# Patient Record
Sex: FEMALE | Race: OTHER | HISPANIC OR LATINO
[De-identification: names, ages, dates, MRNs, and addresses within clinical notes are randomized per-mention and may not be internally consistent; named-entity substitution may affect disease eponyms.]

---

## 2019-09-25 ENCOUNTER — NON-APPOINTMENT (OUTPATIENT)
Age: 34
End: 2019-09-25

## 2019-09-25 ENCOUNTER — APPOINTMENT (OUTPATIENT)
Dept: OPHTHALMOLOGY | Facility: CLINIC | Age: 34
End: 2019-09-25
Payer: COMMERCIAL

## 2019-09-25 PROCEDURE — 92002 INTRM OPH EXAM NEW PATIENT: CPT

## 2019-10-01 ENCOUNTER — NON-APPOINTMENT (OUTPATIENT)
Age: 34
End: 2019-10-01

## 2019-10-01 ENCOUNTER — APPOINTMENT (OUTPATIENT)
Dept: OPHTHALMOLOGY | Facility: CLINIC | Age: 34
End: 2019-10-01
Payer: COMMERCIAL

## 2019-10-01 PROBLEM — Z00.00 ENCOUNTER FOR PREVENTIVE HEALTH EXAMINATION: Status: ACTIVE | Noted: 2019-10-01

## 2019-10-01 PROCEDURE — 92002 INTRM OPH EXAM NEW PATIENT: CPT

## 2019-10-15 ENCOUNTER — APPOINTMENT (OUTPATIENT)
Dept: OPHTHALMOLOGY | Facility: CLINIC | Age: 34
End: 2019-10-15

## 2019-11-12 ENCOUNTER — NON-APPOINTMENT (OUTPATIENT)
Age: 34
End: 2019-11-12

## 2019-11-12 ENCOUNTER — APPOINTMENT (OUTPATIENT)
Dept: OPHTHALMOLOGY | Facility: CLINIC | Age: 34
End: 2019-11-12
Payer: COMMERCIAL

## 2019-11-12 PROCEDURE — 92014 COMPRE OPH EXAM EST PT 1/>: CPT

## 2019-11-19 ENCOUNTER — NON-APPOINTMENT (OUTPATIENT)
Age: 34
End: 2019-11-19

## 2019-11-19 ENCOUNTER — APPOINTMENT (OUTPATIENT)
Dept: OPHTHALMOLOGY | Facility: CLINIC | Age: 34
End: 2019-11-19
Payer: COMMERCIAL

## 2019-11-19 PROCEDURE — 92012 INTRM OPH EXAM EST PATIENT: CPT

## 2021-05-11 ENCOUNTER — NON-APPOINTMENT (OUTPATIENT)
Age: 36
End: 2021-05-11

## 2021-05-11 ENCOUNTER — APPOINTMENT (OUTPATIENT)
Dept: OPHTHALMOLOGY | Facility: CLINIC | Age: 36
End: 2021-05-11
Payer: COMMERCIAL

## 2021-05-11 PROCEDURE — 99072 ADDL SUPL MATRL&STAF TM PHE: CPT

## 2021-05-11 PROCEDURE — 92012 INTRM OPH EXAM EST PATIENT: CPT

## 2023-07-25 ENCOUNTER — APPOINTMENT (OUTPATIENT)
Dept: UROLOGY | Facility: CLINIC | Age: 38
End: 2023-07-25
Payer: COMMERCIAL

## 2023-07-25 VITALS
TEMPERATURE: 97.2 F | BODY MASS INDEX: 29.44 KG/M2 | HEART RATE: 91 BPM | HEIGHT: 62 IN | OXYGEN SATURATION: 99 % | DIASTOLIC BLOOD PRESSURE: 86 MMHG | WEIGHT: 160 LBS | SYSTOLIC BLOOD PRESSURE: 128 MMHG

## 2023-07-25 DIAGNOSIS — F41.9 ANXIETY DISORDER, UNSPECIFIED: ICD-10-CM

## 2023-07-25 DIAGNOSIS — Z78.9 OTHER SPECIFIED HEALTH STATUS: ICD-10-CM

## 2023-07-25 DIAGNOSIS — F32.A ANXIETY DISORDER, UNSPECIFIED: ICD-10-CM

## 2023-07-25 DIAGNOSIS — R39.15 URGENCY OF URINATION: ICD-10-CM

## 2023-07-25 PROCEDURE — 99204 OFFICE O/P NEW MOD 45 MIN: CPT

## 2023-07-25 RX ORDER — BUPROPION HYDROCHLORIDE 100 MG/1
TABLET, FILM COATED ORAL
Refills: 0 | Status: ACTIVE | COMMUNITY

## 2023-07-25 RX ORDER — ESCITALOPRAM OXALATE 5 MG/1
TABLET, FILM COATED ORAL
Refills: 0 | Status: ACTIVE | COMMUNITY

## 2023-07-26 NOTE — LETTER BODY
[Dear  ___] : Dear  [unfilled], [Consult Letter:] : I had the pleasure of evaluating your patient, [unfilled]. [Please see my note below.] : Please see my note below. [Consult Closing:] : Thank you very much for allowing me to participate in the care of this patient.  If you have any questions, please do not hesitate to contact me. [Sincerely,] : Sincerely, [FreeTextEntry3] : Cora Zavala MD\par System Director Urogynecology/FPMRS\par Department of Urology\par [facility] \par   at [facility]\par  of Urology\par [employer] [employer] at [facility]\par

## 2023-07-26 NOTE — PHYSICAL EXAM
[General Appearance - Well Developed] : well developed [General Appearance - Well Nourished] : well nourished [Normal Appearance] : normal appearance [Well Groomed] : well groomed [General Appearance - In No Acute Distress] : no acute distress [Edema] : no peripheral edema Statement Selected [Respiration, Rhythm And Depth] : normal respiratory rhythm and effort [Exaggerated Use Of Accessory Muscles For Inspiration] : no accessory muscle use [Abdomen Soft] : soft [Abdomen Tenderness] : non-tender [Costovertebral Angle Tenderness] : no ~M costovertebral angle tenderness [Normal Station and Gait] : the gait and station were normal for the patient's age [Oriented To Time, Place, And Person] : oriented to person, place, and time [Affect] : the affect was normal [Mood] : the mood was normal [Not Anxious] : not anxious [Urethral Meatus] : the meatus of the urethra showed no abnormalities [External Female Genitalia] : normal external genitalia [] : no rash [FreeTextEntry1] : neg UH, neg CST, no prolapse noted, no hypertonus muscles noted.

## 2023-07-26 NOTE — ASSESSMENT
[FreeTextEntry1] : \par \par \par Impression/plan: 37 year old woman with urge predominant MENDEZ. \par \par - Options for management of the patient's overactive bladder and incontinence discussed at length. These included medical therapy, behavioral modification, bladder retraining, and surgical options. Surgical options for third line therapies reviewed included injection of botulinum toxin versus sacral nerve stimulation therapy. The patient would like to start with behavioral modification, bladder retraining and medical therapy. Discussed behavioral/diet modification avoiding/minimizing bladder irritants (caffeine, alcohol, carbonated beverages,citrus, acidic, spicy foods) can help with urinary symptoms. Trial Gemtesa call in 1 month for update \par - UA with micro, urine c/s. \par

## 2023-07-26 NOTE — HISTORY OF PRESENT ILLNESS
[FreeTextEntry1] : 37 year old  female here for longstanding urinary urgency. She reports in 2023 was on a 8 hour flight and had really strong urge to void. She felt the urge was different from her baseline which compelled her to be evaluated. She reports last week intermittent burning with urination which she attributes to long plane ride. She reports incontinence and wears 3-4 pads daily. as well as leakage with cough, sneeze and laughing. \par \par Denies fevers,chills, nausea, diarrhea, constipation, hematuria, dysuria\par \par 2023 PVR= 55 ml\par \par Force of stream: strong \par Hesitancy: no\par intermittency: no\par Dribbling: yes at the end \par Daytime frequency: 6-7x\par Nighttime frequency: 2x\par Dysuria: yes this week \par Urgency: yes\par UUI:  yes\par ANNIKA: yes\par Pad usage: 3-4\par Straining to void: no\par Incomplete bladder emptying: no\par UTI: yes in the past \par Hematuria: no\par Stone disease: no\par STD: no\par Bowel Issue: no\par Fluid intake and diet: 2L H2O/ mostly seltzer daily, 1 -2 c coffee, lime/vinegar for salads, sometimes spicy\par Pregnancies: \par Prolapse sensation: no\par Most bothersome symptoms: intermittent burning but mostly urgency\par \par PMH: Anxiety/depression\par Surghx: denies\par Famhx: denies  malignancies\par Social hx: nonsmoker, social ETOH\par Medication: Isabella, Bupropion, Concerta, Lexapro\par Allergies: denies\par \par

## 2023-07-28 ENCOUNTER — NON-APPOINTMENT (OUTPATIENT)
Age: 38
End: 2023-07-28

## 2023-07-28 LAB
APPEARANCE: CLEAR
BACTERIA UR CULT: NORMAL
BACTERIA: NEGATIVE /HPF
BILIRUBIN URINE: NEGATIVE
BLOOD URINE: ABNORMAL
CAST: 0 /LPF
COLOR: YELLOW
EPITHELIAL CELLS: 5 /HPF
GLUCOSE QUALITATIVE U: NEGATIVE MG/DL
KETONES URINE: NEGATIVE MG/DL
LEUKOCYTE ESTERASE URINE: NEGATIVE
MICROSCOPIC-UA: NORMAL
NITRITE URINE: NEGATIVE
PH URINE: 5.5
PROTEIN URINE: NEGATIVE MG/DL
RED BLOOD CELLS URINE: 8 /HPF
SPECIFIC GRAVITY URINE: 1.02
UROBILINOGEN URINE: 0.2 MG/DL
WHITE BLOOD CELLS URINE: 2 /HPF

## 2023-08-10 ENCOUNTER — TRANSCRIPTION ENCOUNTER (OUTPATIENT)
Age: 38
End: 2023-08-10

## 2023-08-11 ENCOUNTER — APPOINTMENT (OUTPATIENT)
Dept: UROLOGY | Facility: CLINIC | Age: 38
End: 2023-08-11
Payer: COMMERCIAL

## 2023-08-11 VITALS
OXYGEN SATURATION: 98 % | DIASTOLIC BLOOD PRESSURE: 85 MMHG | TEMPERATURE: 98.2 F | HEART RATE: 92 BPM | SYSTOLIC BLOOD PRESSURE: 144 MMHG

## 2023-08-11 DIAGNOSIS — R31.29 OTHER MICROSCOPIC HEMATURIA: ICD-10-CM

## 2023-08-11 LAB
BILIRUB UR QL STRIP: NORMAL
CLARITY UR: CLEAR
COLLECTION METHOD: NORMAL
GLUCOSE UR-MCNC: NORMAL
HCG UR QL: 0.2 EU/DL
HGB UR QL STRIP.AUTO: NORMAL
KETONES UR-MCNC: NORMAL
LEUKOCYTE ESTERASE UR QL STRIP: NORMAL
NITRITE UR QL STRIP: NORMAL
PH UR STRIP: 5.5
PROT UR STRIP-MCNC: NORMAL
SP GR UR STRIP: >=1.03

## 2023-08-11 PROCEDURE — 99213 OFFICE O/P EST LOW 20 MIN: CPT | Mod: 25

## 2023-08-11 PROCEDURE — 81003 URINALYSIS AUTO W/O SCOPE: CPT | Mod: QW

## 2023-08-11 PROCEDURE — 52000 CYSTOURETHROSCOPY: CPT

## 2023-08-13 NOTE — HISTORY OF PRESENT ILLNESS
[FreeTextEntry1] : 37 year old  female here for longstanding urinary urgency. She reports in 2023 was on a 8 hour flight and had really strong urge to void. She felt the urge was different from her baseline which compelled her to be evaluated. She reports last week intermittent burning with urination which she attributes to long plane ride. She reports incontinence and wears 3-4 pads daily. as well as leakage with cough, sneeze and laughing.  Denies fevers,chills, nausea, diarrhea, constipation, hematuria, dysuria  2023 PVR= 55 ml  Force of stream: strong  Hesitancy: no  intermittency: no  Dribbling: yes at the end  Daytime frequency: 6-7x  Nighttime frequency: 2x  Dysuria: yes this week  Urgency: yes  UUI: yes  ANNIKA: yes  Pad usage: 3-4  Straining to void: no  Incomplete bladder emptying: no  UTI: yes in the past  Hematuria: no  Stone disease: no  STD: no  Bowel Issue: no  Fluid intake and diet: 2L H2O/ mostly seltzer daily, 1 -2 c coffee, lime/vinegar for salads, sometimes spicy  Pregnancies:   Prolapse sensation: no  Most bothersome symptoms: intermittent burning but mostly urgency  PMH: Anxiety/depression  Surghx: denies  Famhx: denies  malignancies  Social hx: nonsmoker, social ETOH  Medication: Isabella, Bupropion, Concerta, Lexapro  Allergies: denies   23  28 yo woman with microhematuria and OAB here today for cystoscopy and discussion of treatment options.   Cystoscopy unremarkable.   Renal US reviewed - small cyst, otherwise unremarkable.

## 2023-08-13 NOTE — ASSESSMENT
[FreeTextEntry1] :   Impression/plan: 26 yo woman with microhematuria and OAB.   1. Options for management of the patient's overactive bladder and incontinence discussed at length. These included medical therapy, behavioral modification, bladder retraining, and surgical options. Surgical options for third line therapies reviewed included injection of botulinum toxin versus sacral nerve stimulation therapy. The patient would like to start with behavioral modification, bladder retraining. Patient would like to hold off on med tx.

## 2023-08-13 NOTE — LETTER BODY
[Dear  ___] : Dear  [unfilled], [Courtesy Letter:] : I had the pleasure of seeing your patient, [unfilled], in my office today. [Please see my note below.] : Please see my note below. [Consult Closing:] : Thank you very much for allowing me to participate in the care of this patient.  If you have any questions, please do not hesitate to contact me. [Sincerely,] : Sincerely, [FreeTextEntry3] : Cora Zavala MD System Director Urogynecology/FPS Department of Urology [facility]    at [facility]  of Urology [facility] [employer] at [facility]

## 2023-11-01 ENCOUNTER — APPOINTMENT (OUTPATIENT)
Dept: UROLOGY | Facility: CLINIC | Age: 38
End: 2023-11-01
Payer: COMMERCIAL

## 2023-11-01 VITALS
TEMPERATURE: 97.7 F | OXYGEN SATURATION: 97 % | HEART RATE: 93 BPM | DIASTOLIC BLOOD PRESSURE: 87 MMHG | SYSTOLIC BLOOD PRESSURE: 126 MMHG

## 2023-11-01 DIAGNOSIS — N39.46 MIXED INCONTINENCE: ICD-10-CM

## 2023-11-01 DIAGNOSIS — N32.81 OVERACTIVE BLADDER: ICD-10-CM

## 2023-11-01 PROCEDURE — 99213 OFFICE O/P EST LOW 20 MIN: CPT

## 2024-03-05 ENCOUNTER — APPOINTMENT (OUTPATIENT)
Dept: UROLOGY | Facility: CLINIC | Age: 39
End: 2024-03-05

## 2024-05-27 VITALS
TEMPERATURE: 98 F | OXYGEN SATURATION: 99 % | HEART RATE: 77 BPM | WEIGHT: 138.89 LBS | SYSTOLIC BLOOD PRESSURE: 130 MMHG | DIASTOLIC BLOOD PRESSURE: 90 MMHG | RESPIRATION RATE: 16 BRPM | HEIGHT: 63 IN

## 2024-05-27 LAB
ALBUMIN SERPL ELPH-MCNC: 4.2 G/DL — SIGNIFICANT CHANGE UP (ref 3.3–5)
ALP SERPL-CCNC: 88 U/L — SIGNIFICANT CHANGE UP (ref 40–120)
ALT FLD-CCNC: 172 U/L — HIGH (ref 10–45)
ANION GAP SERPL CALC-SCNC: 12 MMOL/L — SIGNIFICANT CHANGE UP (ref 5–17)
AST SERPL-CCNC: 197 U/L — HIGH (ref 10–40)
BASOPHILS # BLD AUTO: 0.04 K/UL — SIGNIFICANT CHANGE UP (ref 0–0.2)
BASOPHILS NFR BLD AUTO: 0.5 % — SIGNIFICANT CHANGE UP (ref 0–2)
BILIRUB SERPL-MCNC: 0.6 MG/DL — SIGNIFICANT CHANGE UP (ref 0.2–1.2)
BUN SERPL-MCNC: 9 MG/DL — SIGNIFICANT CHANGE UP (ref 7–23)
CALCIUM SERPL-MCNC: 10.1 MG/DL — SIGNIFICANT CHANGE UP (ref 8.4–10.5)
CHLORIDE SERPL-SCNC: 102 MMOL/L — SIGNIFICANT CHANGE UP (ref 96–108)
CO2 SERPL-SCNC: 27 MMOL/L — SIGNIFICANT CHANGE UP (ref 22–31)
CREAT SERPL-MCNC: 0.75 MG/DL — SIGNIFICANT CHANGE UP (ref 0.5–1.3)
EGFR: 104 ML/MIN/1.73M2 — SIGNIFICANT CHANGE UP
EOSINOPHIL # BLD AUTO: 0.19 K/UL — SIGNIFICANT CHANGE UP (ref 0–0.5)
EOSINOPHIL NFR BLD AUTO: 2.6 % — SIGNIFICANT CHANGE UP (ref 0–6)
GLUCOSE SERPL-MCNC: 109 MG/DL — HIGH (ref 70–99)
HCG SERPL-ACNC: <1 MIU/ML — SIGNIFICANT CHANGE UP
HCT VFR BLD CALC: 35 % — SIGNIFICANT CHANGE UP (ref 34.5–45)
HGB BLD-MCNC: 11.7 G/DL — SIGNIFICANT CHANGE UP (ref 11.5–15.5)
IMM GRANULOCYTES NFR BLD AUTO: 0.4 % — SIGNIFICANT CHANGE UP (ref 0–0.9)
LIDOCAIN IGE QN: 105 U/L — HIGH (ref 7–60)
LYMPHOCYTES # BLD AUTO: 1.59 K/UL — SIGNIFICANT CHANGE UP (ref 1–3.3)
LYMPHOCYTES # BLD AUTO: 21.4 % — SIGNIFICANT CHANGE UP (ref 13–44)
MAGNESIUM SERPL-MCNC: 2 MG/DL — SIGNIFICANT CHANGE UP (ref 1.6–2.6)
MCHC RBC-ENTMCNC: 28.8 PG — SIGNIFICANT CHANGE UP (ref 27–34)
MCHC RBC-ENTMCNC: 33.4 GM/DL — SIGNIFICANT CHANGE UP (ref 32–36)
MCV RBC AUTO: 86.2 FL — SIGNIFICANT CHANGE UP (ref 80–100)
MONOCYTES # BLD AUTO: 0.47 K/UL — SIGNIFICANT CHANGE UP (ref 0–0.9)
MONOCYTES NFR BLD AUTO: 6.3 % — SIGNIFICANT CHANGE UP (ref 2–14)
NEUTROPHILS # BLD AUTO: 5.1 K/UL — SIGNIFICANT CHANGE UP (ref 1.8–7.4)
NEUTROPHILS NFR BLD AUTO: 68.8 % — SIGNIFICANT CHANGE UP (ref 43–77)
NRBC # BLD: 0 /100 WBCS — SIGNIFICANT CHANGE UP (ref 0–0)
PLATELET # BLD AUTO: 422 K/UL — HIGH (ref 150–400)
POTASSIUM SERPL-MCNC: 3.8 MMOL/L — SIGNIFICANT CHANGE UP (ref 3.5–5.3)
POTASSIUM SERPL-SCNC: 3.8 MMOL/L — SIGNIFICANT CHANGE UP (ref 3.5–5.3)
PROT SERPL-MCNC: 7.5 G/DL — SIGNIFICANT CHANGE UP (ref 6–8.3)
RBC # BLD: 4.06 M/UL — SIGNIFICANT CHANGE UP (ref 3.8–5.2)
RBC # FLD: 12.2 % — SIGNIFICANT CHANGE UP (ref 10.3–14.5)
SODIUM SERPL-SCNC: 141 MMOL/L — SIGNIFICANT CHANGE UP (ref 135–145)
TROPONIN T, HIGH SENSITIVITY RESULT: <6 NG/L — SIGNIFICANT CHANGE UP (ref 0–51)
WBC # BLD: 7.42 K/UL — SIGNIFICANT CHANGE UP (ref 3.8–10.5)
WBC # FLD AUTO: 7.42 K/UL — SIGNIFICANT CHANGE UP (ref 3.8–10.5)

## 2024-05-27 PROCEDURE — 93010 ELECTROCARDIOGRAM REPORT: CPT

## 2024-05-27 PROCEDURE — 99285 EMERGENCY DEPT VISIT HI MDM: CPT

## 2024-05-27 RX ORDER — SODIUM CHLORIDE 9 MG/ML
1000 INJECTION INTRAMUSCULAR; INTRAVENOUS; SUBCUTANEOUS ONCE
Refills: 0 | Status: COMPLETED | OUTPATIENT
Start: 2024-05-27 | End: 2024-05-27

## 2024-05-27 RX ORDER — PANTOPRAZOLE SODIUM 20 MG/1
20 TABLET, DELAYED RELEASE ORAL ONCE
Refills: 0 | Status: COMPLETED | OUTPATIENT
Start: 2024-05-27 | End: 2024-05-27

## 2024-05-27 RX ORDER — FAMOTIDINE 10 MG/ML
20 INJECTION INTRAVENOUS ONCE
Refills: 0 | Status: COMPLETED | OUTPATIENT
Start: 2024-05-27 | End: 2024-05-27

## 2024-05-27 RX ORDER — ONDANSETRON 8 MG/1
4 TABLET, FILM COATED ORAL ONCE
Refills: 0 | Status: COMPLETED | OUTPATIENT
Start: 2024-05-27 | End: 2024-05-27

## 2024-05-27 RX ORDER — KETOROLAC TROMETHAMINE 30 MG/ML
15 SYRINGE (ML) INJECTION ONCE
Refills: 0 | Status: DISCONTINUED | OUTPATIENT
Start: 2024-05-27 | End: 2024-05-27

## 2024-05-27 RX ADMIN — PANTOPRAZOLE SODIUM 20 MILLIGRAM(S): 20 TABLET, DELAYED RELEASE ORAL at 23:28

## 2024-05-27 RX ADMIN — SODIUM CHLORIDE 2000 MILLILITER(S): 9 INJECTION INTRAMUSCULAR; INTRAVENOUS; SUBCUTANEOUS at 22:35

## 2024-05-27 RX ADMIN — Medication 30 MILLILITER(S): at 23:28

## 2024-05-27 RX ADMIN — ONDANSETRON 4 MILLIGRAM(S): 8 TABLET, FILM COATED ORAL at 22:36

## 2024-05-27 RX ADMIN — FAMOTIDINE 20 MILLIGRAM(S): 10 INJECTION INTRAVENOUS at 23:29

## 2024-05-27 RX ADMIN — Medication 15 MILLIGRAM(S): at 23:28

## 2024-05-27 NOTE — ED PROVIDER NOTE - CLINICAL SUMMARY MEDICAL DECISION MAKING FREE TEXT BOX
38-year-old female with upper abdominal pain will evaluate for GERD pancreatitis symptomatic cholelithiasis versus cholecystitis plan for CBC CMP lipase hCG EKG   less likely to have atypical ACS but given her overall benign exam will add on troponin     EKG   Pepcid Maalox Protonix Toradol   reassessment 38-year-old female with upper abdominal pain will evaluate for GERD pancreatitis symptomatic cholelithiasis versus cholecystitis plan for CBC CMP lipase hCG EKG  Normal sinus rhythm 68 normal axis and intervals no acute ischemia no STEMI   less likely to have atypical ACS but given her overall benign exam will add on troponin     EKG   Pepcid Maalox Protonix Toradol   reassessment

## 2024-05-27 NOTE — ED PROVIDER NOTE - PROGRESS NOTE DETAILS
Patient with elevated lipase and LFTs mild pancreatitis right upper quadrant pending will consider advanced imaging if right upper quadrant negative will reassess patient and ask if patient has been drinking alcohol. Patient with mild pancreatitis feeling improved after pain medication still mildly tender in the epigastrium ultrasound shows mild CBD dilation no gallstones, will require admission for further workup MRCP patient did have a drink yesterday of lemon cello does not drink every day has not had issues previously.  Does not take Ozempic

## 2024-05-27 NOTE — ED ADULT TRIAGE NOTE - CHIEF COMPLAINT QUOTE
Pt, with no reported PMH, presents to ER c/o sudden onset of constant epigastric "9/10 punching" pain with associated N/V for ~5hrs. Pt reports similar episode 2 days ago relieved s/p vomiting, Pt denies any other associated symptoms at this time.

## 2024-05-27 NOTE — ED PROVIDER NOTE - PHYSICAL EXAMINATION
VITAL SIGNS: I have reviewed nursing notes and confirm.  CONSTITUTIONAL: Well appearing, in no acute distress.   SKIN:  warm and dry, no acute rash.   HEAD:  normocephalic, atraumatic.  EYES: EOM intact; conjunctiva and sclera clear.  ENT: No nasal discharge; airway clear.   NECK: Supple.  CARD: S1, S2, Regular rate and rhythm.   RESP:  Clear to auscultation b/l, no wheezes, rales or rhonchi.  ABD:Mild epigastric and right upper quadrant tenderness with negative Fall sign no rebound or guarding no CVA tenderness  EXT: Normal ROM. No peripheral edema. Pulses intact and equal b/l.  NEURO: Alert, oriented, grossly unremarkable  PSYCH: Cooperative, mood and affect appropriate.

## 2024-05-27 NOTE — ED PROVIDER NOTE - OBJECTIVE STATEMENT
38-year-old female  past medical history of GERD, here today with epigastric pain associated with nausea and vomiting which has been ongoing since this past Saturday.  Patient states that she had an episode a Saturday which has not subsided and today came back at around 6 PM patient developed severe epigastric pain nonradiating associated with nonbloody nonbilious emesis.  Denies fever chills cough.  No shortness of breath.  No radiation to right upper quadrant.  Denies drinking alcohol. denies associated urinary symptoms

## 2024-05-27 NOTE — ED ADULT NURSE NOTE - NSFALLUNIVINTERV_ED_ALL_ED
Bed/Stretcher in lowest position, wheels locked, appropriate side rails in place/Call bell, personal items and telephone in reach/Instruct patient to call for assistance before getting out of bed/chair/stretcher/Non-slip footwear applied when patient is off stretcher/[place] to call system/Physically safe environment - no spills, clutter or unnecessary equipment/Purposeful proactive rounding/Room/bathroom lighting operational, light cord in reach

## 2024-05-27 NOTE — ED ADULT NURSE NOTE - OBJECTIVE STATEMENT
pt walked in aox4 c/o epigastric abdominal pain since 4 pm. pt had 2 episodes of emesis today. pt states similar episode a few days ago where symptoms resolved after a few hours. peripheral iv placed, labs drawn and sent. pt medicated as per MAR.

## 2024-05-28 ENCOUNTER — INPATIENT (INPATIENT)
Facility: HOSPITAL | Age: 39
LOS: 0 days | Discharge: HOME | DRG: 384 | End: 2024-05-29
Attending: INTERNAL MEDICINE | Admitting: STUDENT IN AN ORGANIZED HEALTH CARE EDUCATION/TRAINING PROGRAM
Payer: COMMERCIAL

## 2024-05-28 DIAGNOSIS — K21.9 GASTRO-ESOPHAGEAL REFLUX DISEASE WITHOUT ESOPHAGITIS: ICD-10-CM

## 2024-05-28 DIAGNOSIS — Z29.9 ENCOUNTER FOR PROPHYLACTIC MEASURES, UNSPECIFIED: ICD-10-CM

## 2024-05-28 DIAGNOSIS — F41.9 ANXIETY DISORDER, UNSPECIFIED: ICD-10-CM

## 2024-05-28 DIAGNOSIS — R10.9 UNSPECIFIED ABDOMINAL PAIN: ICD-10-CM

## 2024-05-28 DIAGNOSIS — F90.9 ATTENTION-DEFICIT HYPERACTIVITY DISORDER, UNSPECIFIED TYPE: ICD-10-CM

## 2024-05-28 DIAGNOSIS — K80.70 CALCULUS OF GALLBLADDER AND BILE DUCT WITHOUT CHOLECYSTITIS WITHOUT OBSTRUCTION: ICD-10-CM

## 2024-05-28 LAB
ADD ON TEST-SPECIMEN IN LAB: SIGNIFICANT CHANGE UP
ALBUMIN SERPL ELPH-MCNC: 3.6 G/DL — SIGNIFICANT CHANGE UP (ref 3.3–5)
ALBUMIN SERPL ELPH-MCNC: 3.7 G/DL — SIGNIFICANT CHANGE UP (ref 3.3–5)
ALP SERPL-CCNC: 107 U/L — SIGNIFICANT CHANGE UP (ref 40–120)
ALP SERPL-CCNC: 94 U/L — SIGNIFICANT CHANGE UP (ref 40–120)
ALT FLD-CCNC: 441 U/L — HIGH (ref 10–45)
ALT FLD-CCNC: 553 U/L — HIGH (ref 10–45)
ANION GAP SERPL CALC-SCNC: 7 MMOL/L — SIGNIFICANT CHANGE UP (ref 5–17)
ANION GAP SERPL CALC-SCNC: 9 MMOL/L — SIGNIFICANT CHANGE UP (ref 5–17)
AST SERPL-CCNC: 529 U/L — HIGH (ref 10–40)
AST SERPL-CCNC: 604 U/L — HIGH (ref 10–40)
BASOPHILS # BLD AUTO: 0.03 K/UL — SIGNIFICANT CHANGE UP (ref 0–0.2)
BASOPHILS NFR BLD AUTO: 0.4 % — SIGNIFICANT CHANGE UP (ref 0–2)
BILIRUB SERPL-MCNC: 0.6 MG/DL — SIGNIFICANT CHANGE UP (ref 0.2–1.2)
BILIRUB SERPL-MCNC: 0.8 MG/DL — SIGNIFICANT CHANGE UP (ref 0.2–1.2)
BUN SERPL-MCNC: 6 MG/DL — LOW (ref 7–23)
BUN SERPL-MCNC: 7 MG/DL — SIGNIFICANT CHANGE UP (ref 7–23)
CALCIUM SERPL-MCNC: 8.9 MG/DL — SIGNIFICANT CHANGE UP (ref 8.4–10.5)
CALCIUM SERPL-MCNC: 9.2 MG/DL — SIGNIFICANT CHANGE UP (ref 8.4–10.5)
CHLORIDE SERPL-SCNC: 106 MMOL/L — SIGNIFICANT CHANGE UP (ref 96–108)
CHLORIDE SERPL-SCNC: 107 MMOL/L — SIGNIFICANT CHANGE UP (ref 96–108)
CHOLEST SERPL-MCNC: 191 MG/DL — SIGNIFICANT CHANGE UP
CO2 SERPL-SCNC: 25 MMOL/L — SIGNIFICANT CHANGE UP (ref 22–31)
CO2 SERPL-SCNC: 27 MMOL/L — SIGNIFICANT CHANGE UP (ref 22–31)
CREAT SERPL-MCNC: 0.73 MG/DL — SIGNIFICANT CHANGE UP (ref 0.5–1.3)
CREAT SERPL-MCNC: 0.75 MG/DL — SIGNIFICANT CHANGE UP (ref 0.5–1.3)
EGFR: 104 ML/MIN/1.73M2 — SIGNIFICANT CHANGE UP
EGFR: 107 ML/MIN/1.73M2 — SIGNIFICANT CHANGE UP
EOSINOPHIL # BLD AUTO: 0.15 K/UL — SIGNIFICANT CHANGE UP (ref 0–0.5)
EOSINOPHIL NFR BLD AUTO: 2.2 % — SIGNIFICANT CHANGE UP (ref 0–6)
GGT SERPL-CCNC: 304 U/L — HIGH (ref 8–40)
GLUCOSE SERPL-MCNC: 102 MG/DL — HIGH (ref 70–99)
GLUCOSE SERPL-MCNC: 98 MG/DL — SIGNIFICANT CHANGE UP (ref 70–99)
HAV IGM SER-ACNC: SIGNIFICANT CHANGE UP
HBV CORE AB SER-ACNC: SIGNIFICANT CHANGE UP
HBV CORE IGM SER-ACNC: SIGNIFICANT CHANGE UP
HBV SURFACE AB SER-ACNC: REACTIVE
HBV SURFACE AG SER-ACNC: SIGNIFICANT CHANGE UP
HCT VFR BLD CALC: 29.8 % — LOW (ref 34.5–45)
HCV AB S/CO SERPL IA: 0.06 S/CO — SIGNIFICANT CHANGE UP
HCV AB SERPL-IMP: SIGNIFICANT CHANGE UP
HDLC SERPL-MCNC: 52 MG/DL — SIGNIFICANT CHANGE UP
HGB BLD-MCNC: 9.9 G/DL — LOW (ref 11.5–15.5)
IMM GRANULOCYTES NFR BLD AUTO: 0.4 % — SIGNIFICANT CHANGE UP (ref 0–0.9)
LIPID PNL WITH DIRECT LDL SERPL: 119 MG/DL — HIGH
LYMPHOCYTES # BLD AUTO: 1.78 K/UL — SIGNIFICANT CHANGE UP (ref 1–3.3)
LYMPHOCYTES # BLD AUTO: 26.6 % — SIGNIFICANT CHANGE UP (ref 13–44)
MAGNESIUM SERPL-MCNC: 2 MG/DL — SIGNIFICANT CHANGE UP (ref 1.6–2.6)
MCHC RBC-ENTMCNC: 28.7 PG — SIGNIFICANT CHANGE UP (ref 27–34)
MCHC RBC-ENTMCNC: 33.2 GM/DL — SIGNIFICANT CHANGE UP (ref 32–36)
MCV RBC AUTO: 86.4 FL — SIGNIFICANT CHANGE UP (ref 80–100)
MONOCYTES # BLD AUTO: 0.39 K/UL — SIGNIFICANT CHANGE UP (ref 0–0.9)
MONOCYTES NFR BLD AUTO: 5.8 % — SIGNIFICANT CHANGE UP (ref 2–14)
NEUTROPHILS # BLD AUTO: 4.32 K/UL — SIGNIFICANT CHANGE UP (ref 1.8–7.4)
NEUTROPHILS NFR BLD AUTO: 64.6 % — SIGNIFICANT CHANGE UP (ref 43–77)
NON HDL CHOLESTEROL: 139 MG/DL — HIGH
NRBC # BLD: 0 /100 WBCS — SIGNIFICANT CHANGE UP (ref 0–0)
PHOSPHATE SERPL-MCNC: 4.1 MG/DL — SIGNIFICANT CHANGE UP (ref 2.5–4.5)
PLATELET # BLD AUTO: 348 K/UL — SIGNIFICANT CHANGE UP (ref 150–400)
POTASSIUM SERPL-MCNC: 3.8 MMOL/L — SIGNIFICANT CHANGE UP (ref 3.5–5.3)
POTASSIUM SERPL-MCNC: 4.4 MMOL/L — SIGNIFICANT CHANGE UP (ref 3.5–5.3)
POTASSIUM SERPL-SCNC: 3.8 MMOL/L — SIGNIFICANT CHANGE UP (ref 3.5–5.3)
POTASSIUM SERPL-SCNC: 4.4 MMOL/L — SIGNIFICANT CHANGE UP (ref 3.5–5.3)
PROT SERPL-MCNC: 6.2 G/DL — SIGNIFICANT CHANGE UP (ref 6–8.3)
PROT SERPL-MCNC: 6.3 G/DL — SIGNIFICANT CHANGE UP (ref 6–8.3)
RBC # BLD: 3.45 M/UL — LOW (ref 3.8–5.2)
RBC # FLD: 12.2 % — SIGNIFICANT CHANGE UP (ref 10.3–14.5)
SODIUM SERPL-SCNC: 140 MMOL/L — SIGNIFICANT CHANGE UP (ref 135–145)
SODIUM SERPL-SCNC: 141 MMOL/L — SIGNIFICANT CHANGE UP (ref 135–145)
TRIGL SERPL-MCNC: 107 MG/DL — SIGNIFICANT CHANGE UP
TRIGL SERPL-MCNC: 107 MG/DL — SIGNIFICANT CHANGE UP
WBC # BLD: 6.7 K/UL — SIGNIFICANT CHANGE UP (ref 3.8–10.5)
WBC # FLD AUTO: 6.7 K/UL — SIGNIFICANT CHANGE UP (ref 3.8–10.5)

## 2024-05-28 PROCEDURE — 76705 ECHO EXAM OF ABDOMEN: CPT | Mod: 26

## 2024-05-28 PROCEDURE — 99223 1ST HOSP IP/OBS HIGH 75: CPT | Mod: GC

## 2024-05-28 PROCEDURE — 74176 CT ABD & PELVIS W/O CONTRAST: CPT | Mod: 26

## 2024-05-28 RX ORDER — ACETAMINOPHEN 500 MG
650 TABLET ORAL EVERY 6 HOURS
Refills: 0 | Status: DISCONTINUED | OUTPATIENT
Start: 2024-05-28 | End: 2024-05-29

## 2024-05-28 RX ORDER — MORPHINE SULFATE 50 MG/1
4 CAPSULE, EXTENDED RELEASE ORAL EVERY 6 HOURS
Refills: 0 | Status: DISCONTINUED | OUTPATIENT
Start: 2024-05-28 | End: 2024-05-28

## 2024-05-28 RX ORDER — ESCITALOPRAM OXALATE 10 MG/1
1 TABLET, FILM COATED ORAL
Refills: 0 | DISCHARGE

## 2024-05-28 RX ORDER — PANTOPRAZOLE SODIUM 20 MG/1
40 TABLET, DELAYED RELEASE ORAL
Refills: 0 | Status: DISCONTINUED | OUTPATIENT
Start: 2024-05-29 | End: 2024-05-29

## 2024-05-28 RX ORDER — SODIUM CHLORIDE 9 MG/ML
1000 INJECTION, SOLUTION INTRAVENOUS
Refills: 0 | Status: DISCONTINUED | OUTPATIENT
Start: 2024-05-28 | End: 2024-05-28

## 2024-05-28 RX ORDER — MORPHINE SULFATE 50 MG/1
2 CAPSULE, EXTENDED RELEASE ORAL EVERY 6 HOURS
Refills: 0 | Status: DISCONTINUED | OUTPATIENT
Start: 2024-05-28 | End: 2024-05-28

## 2024-05-28 RX ORDER — ESCITALOPRAM OXALATE 10 MG/1
10 TABLET, FILM COATED ORAL DAILY
Refills: 0 | Status: DISCONTINUED | OUTPATIENT
Start: 2024-05-28 | End: 2024-05-29

## 2024-05-28 RX ORDER — PANTOPRAZOLE SODIUM 20 MG/1
40 TABLET, DELAYED RELEASE ORAL DAILY
Refills: 0 | Status: DISCONTINUED | OUTPATIENT
Start: 2024-05-28 | End: 2024-05-28

## 2024-05-28 RX ORDER — METHYLPHENIDATE HCL 5 MG
1 TABLET ORAL
Refills: 0 | DISCHARGE

## 2024-05-28 RX ORDER — ENOXAPARIN SODIUM 100 MG/ML
40 INJECTION SUBCUTANEOUS EVERY 24 HOURS
Refills: 0 | Status: DISCONTINUED | OUTPATIENT
Start: 2024-05-29 | End: 2024-05-29

## 2024-05-28 RX ADMIN — Medication 650 MILLIGRAM(S): at 22:40

## 2024-05-28 RX ADMIN — Medication 650 MILLIGRAM(S): at 22:19

## 2024-05-28 RX ADMIN — SODIUM CHLORIDE 100 MILLILITER(S): 9 INJECTION, SOLUTION INTRAVENOUS at 07:14

## 2024-05-28 RX ADMIN — ESCITALOPRAM OXALATE 10 MILLIGRAM(S): 10 TABLET, FILM COATED ORAL at 12:15

## 2024-05-28 RX ADMIN — PANTOPRAZOLE SODIUM 40 MILLIGRAM(S): 20 TABLET, DELAYED RELEASE ORAL at 12:15

## 2024-05-28 NOTE — PROGRESS NOTE ADULT - ASSESSMENT
Ms. Vazquez is a 39 yo female with pmhx of GERD presenting with abdominal pain for last two days, thought to be peptic ulcer disease vs pancreatitis.  Ms. Vazquez is a 37 yo female with pmhx of GERD presenting with abdominal pain for last two days, thought to be peptic ulcer disease vs pancreatitis.  Ms. Vazquez is a 39 yo female with pmhx of GERD presenting with abdominal pain for last two days, thought to be peptic ulcer disease vs pancreatitis. Her pain is greatly improved since receiving fluids last night and now tolerates liquid diet.

## 2024-05-28 NOTE — H&P ADULT - PROBLEM SELECTOR PLAN 1
Patient with epigastric pain that radiates diffusely across the abdomen and into the back w/ acid reflux, chest tightness, vomiting. Started on Saturday, went away on Sunday, and reappeared on Monday afternoon. Pain started on both occasions after eating and gradually worsened as time passed. Patient to peptobismol without relief. Denies regular alcohol use, tobacco, drug use. Cholesterol levels are at upper limit of normal per patient. No hx of gallbladder issues or family history of such. Recent travel hx to [place] where she got food poisoning.    Lipase only at 105 and US with no visualization of pancreatic inflammation. No CT abdomen was performed. AST/ALT was elevated and US did visualize mildly dilated CBD.    DDX: PUD vs Pancreatitis    Plan:   - C/w Maintenance fluids at 100 cc/hr  - F/u CT AP  - Pantoprazole 40 mg IV  - F/u Triglyceride level  - Clear liquid diet, increase as tolerated Patient with epigastric pain that radiates diffusely across the abdomen and into the back w/ acid reflux, chest tightness, vomiting. Started on Saturday, went away on Sunday, and reappeared on Monday afternoon. Pain started on both occasions after eating and gradually worsened as time passed. Patient to Pepto Bismol without relief. Denies regular alcohol use, tobacco, drug use. Cholesterol levels are at upper limit of normal per patient. No hx of gallbladder issues or family history of such. Recent travel hx to [place] where she got food poisoning.    Lipase only at 105 and US with no visualization of pancreatic inflammation. No CT abdomen was performed. AST/ALT was elevated and US did visualize mildly dilated CBD. Symptoms appear to fit more clinical picture of PUD especially given hx of GERD and can see elevated AST/ALT in such cases. Lipase is low and no current imaging findings indicate pancreatitis outside of abdominal examination. However CBD is mildly elevated which can indicate possible stone/passed stone that induced these symptoms.     DDX: PUD vs Pancreatitis    Plan:   - C/w Maintenance fluids at 100 cc/hr  - F/u CT AP  - F/u Triglyceride level  - Pantoprazole 40 mg IV qd  - Clear liquid diet, increase as tolerated  - Possible GI consult if symptoms worsen and/or labs worsen  - If concern for PUD, will need scope for accurate identification Patient with epigastric pain that radiates diffusely across the abdomen and into the back w/ acid reflux, chest tightness, vomiting. Started on Saturday, went away on Sunday, and reappeared on Monday afternoon. Pain started on both occasions after eating and gradually worsened as time passed. Patient took Pepto Bismol without relief. Denies regular alcohol use, tobacco, drug use. Cholesterol levels are at upper limit of normal per patient. No hx of gallbladder issues or family history of such. Recent travel hx to [place] where she got food poisoning.    Lipase only at 105 and US with no visualization of pancreatic inflammation. CT abdomen was not yet performed. AST/ALT was elevated and US did visualize mildly dilated CBD. Symptoms appear to fit more clinical picture for PUD especially given hx of GERD and can also see elevated AST/ALT in these cases. Lipase is low and no current imaging findings indicate pancreatitis, only abdominal examination has possibility of elucidating diagnosis. CBD is mildly dilated which can indicate possible stone/passed stone that induced these symptoms.     DDX: PUD vs Pancreatitis    Plan:   - C/w Maintenance fluids at 100 cc/hr  - F/u CT AP  - F/u Triglyceride level  - Pantoprazole 40 mg IV qd  - Pain Control  - Clear liquid diet, increase as tolerated  - Possible GI consult if symptoms worsen and/or labs worsen  - If concern for PUD, will need scope for accurate identification Patient with epigastric pain that radiates diffusely across the abdomen and into the back w/ acid reflux, chest tightness, vomiting. Started on Saturday, went away on Sunday, and reappeared on Monday afternoon. Pain started on both occasions after eating and gradually worsened as time passed. Patient took Pepto Bismol without relief. Denies regular alcohol use, tobacco, drug use. Cholesterol levels are at upper limit of normal per patient. No hx of gallbladder issues or family history of such. Recent travel hx to [place] where she got food poisoning.    Lipase only at 105 and US with no visualization of pancreatic inflammation. CT abdomen was not yet performed. AST/ALT was elevated and US did visualize mildly dilated CBD. Symptoms appear to fit more clinical picture for PUD especially given hx of GERD and can also see elevated AST/ALT in these cases. Only 1/3 criteria met to elucidate pancreatitis diagnosis given abdominal examination. CBD is mildly dilated which can indicate possible stone/passed stone that then induced minor pancreatitis.     DDX: PUD vs Pancreatitis    Plan:   - C/w Maintenance fluids at 100 cc/hr  - F/u CT AP  - F/u Triglyceride level  - Pantoprazole 40 mg IV qd  - Pain Control  - Clear liquid diet, increase as tolerated  - Possible GI consult if symptoms worsen and/or labs worsen  - If concern for PUD, will need scope for accurate identification

## 2024-05-28 NOTE — H&P ADULT - NSHPPHYSICALEXAM_GEN_ALL_CORE
.  VITAL SIGNS:  T(C): 36.9 (05-28-24 @ 02:58), Max: 36.9 (05-28-24 @ 02:58)  T(F): 98.4 (05-28-24 @ 02:58), Max: 98.4 (05-28-24 @ 02:58)  HR: 70 (05-28-24 @ 02:58) (68 - 77)  BP: 128/80 (05-28-24 @ 02:58) (127/87 - 130/90)  BP(mean): --  RR: 17 (05-28-24 @ 02:58) (16 - 17)  SpO2: 98% (05-28-24 @ 02:58) (98% - 99%)  Wt(kg): --    PHYSICAL EXAM:    Constitutional: WDWN resting comfortably in bed; NAD  Head: NC/AT  Eyes: PERRL, EOMI, anicteric sclera  ENT: no nasal discharge; uvula midline, no oropharyngeal erythema or exudates; MMM  Neck: supple; no JVD or thyromegaly  Respiratory: CTA B/L; no W/R/R, no retractions  Cardiac: +S1/S2; RRR; no M/R/G; PMI non-displaced  Gastrointestinal: soft, NT/ND; no rebound or guarding; +BSx4  Genitourinary: normal external genitalia  Back: spine midline, no bony tenderness or step-offs; no CVAT B/L  Extremities: WWP, no clubbing or cyanosis; no peripheral edema  Musculoskeletal: NROM x4; no joint swelling, tenderness or erythema  Vascular: 2+ radial, femoral, DP/PT pulses B/L  Dermatologic: skin warm, dry and intact; no rashes, wounds, or scars  Lymphatic: no submandibular or cervical LAD  Neurologic: AAOx3; CNII-XII grossly intact; no focal deficits  Psychiatric: affect and characteristics of appearance, verbalizations, behaviors are appropriate .  VITAL SIGNS:  T(C): 36.9 (05-28-24 @ 02:58), Max: 36.9 (05-28-24 @ 02:58)  T(F): 98.4 (05-28-24 @ 02:58), Max: 98.4 (05-28-24 @ 02:58)  HR: 70 (05-28-24 @ 02:58) (68 - 77)  BP: 128/80 (05-28-24 @ 02:58) (127/87 - 130/90)  BP(mean): --  RR: 17 (05-28-24 @ 02:58) (16 - 17)  SpO2: 98% (05-28-24 @ 02:58) (98% - 99%)  Wt(kg): --    PHYSICAL EXAM:    Constitutional: Resting in bed; NAD  Respiratory: CTA B/L; no W/R/R, no retractions  Cardiac: +S1/S2; RRR; no M/R/G; PMI non-displaced  Gastrointestinal: soft, tenderness to light palpation at epigastric and RUQ area predominantly. BS4+.   Extremities: WWP, no clubbing or cyanosis; no peripheral edema  Musculoskeletal: No joint swelling, tenderness or erythema  Vascular: 2+ radial, PT pulses B/L  Dermatologic: skin warm, dry and intact; no rashes, wounds, or scars  Neurologic: AAOx3; CNII-XII grossly intact; no focal deficits  Psychiatric: affect and characteristics of appearance, verbalizations, behaviors are appropriate

## 2024-05-28 NOTE — PATIENT PROFILE ADULT - FALL HARM RISK - UNIVERSAL INTERVENTIONS
Bed in lowest position, wheels locked, appropriate side rails in place/Call bell, personal items and telephone in reach/Instruct patient to call for assistance before getting out of bed or chair/Non-slip footwear when patient is out of bed/[place] to call system/Physically safe environment - no spills, clutter or unnecessary equipment/Purposeful Proactive Rounding/Room/bathroom lighting operational, light cord in reach

## 2024-05-28 NOTE — PROGRESS NOTE ADULT - PROBLEM SELECTOR PLAN 2
Patient with history of GERD. Does not take any medications for this at this time.    Plan:   - NTD Patient with history of GERD. Does not take any medications for this at this time.    Plan:   -Take Protonix to help with GERD in addition to PUD

## 2024-05-28 NOTE — PROGRESS NOTE ADULT - SUBJECTIVE AND OBJECTIVE BOX
**INCOMPLETE NOTE    OVERNIGHT EVENTS:    SUBJECTIVE:  Patient seen and examined at bedside.    Vital Signs Last 12 Hrs  T(F): 97.8 (05-28-24 @ 05:54), Max: 98.4 (05-28-24 @ 02:58)  HR: 72 (05-28-24 @ 05:54) (60 - 72)  BP: 115/79 (05-28-24 @ 05:54) (115/79 - 137/84)  BP(mean): --  RR: 18 (05-28-24 @ 05:54) (16 - 18)  SpO2: 98% (05-28-24 @ 05:54) (97% - 99%)  I&O's Summary      PHYSICAL EXAM:  Constitutional: NAD, comfortable in bed.  HEENT: NC/AT, PERRLA, EOMI, no conjunctival pallor or scleral icterus, MMM  Neck: Supple, no JVD  Respiratory: CTA B/L. No w/r/r.   Cardiovascular: RRR, normal S1 and S2, no m/r/g.   Gastrointestinal: +BS, soft NTND, no guarding or rebound tenderness, no palpable masses   Extremities: wwp; no cyanosis, clubbing or edema.   Vascular: Pulses equal and strong throughout.   Neurological: AAOx3, no CN deficits, strength and sensation intact throughout.   Skin: No gross skin abnormalities or rashes        LABS:                        9.9    6.70  )-----------( 348      ( 28 May 2024 05:30 )             29.8     05-28    140  |  106  |  7   ----------------------------<  98  3.8   |  25  |  0.73    Ca    9.2      28 May 2024 05:30  Phos  4.1     05-28  Mg     2.0     05-28    TPro  6.3  /  Alb  3.7  /  TBili  0.8  /  DBili  x   /  AST  529<H>  /  ALT  441<H>  /  AlkPhos  94  05-28      Urinalysis Basic - ( 28 May 2024 05:30 )    Color: x / Appearance: x / SG: x / pH: x  Gluc: 98 mg/dL / Ketone: x  / Bili: x / Urobili: x   Blood: x / Protein: x / Nitrite: x   Leuk Esterase: x / RBC: x / WBC x   Sq Epi: x / Non Sq Epi: x / Bacteria: x          RADIOLOGY & ADDITIONAL TESTS:    MEDICATIONS  (STANDING):  escitalopram 10 milliGRAM(s) Oral daily  lactated ringers. 1000 milliLiter(s) (100 mL/Hr) IV Continuous <Continuous>  pantoprazole  Injectable 40 milliGRAM(s) IV Push daily    MEDICATIONS  (PRN):  acetaminophen     Tablet .. 650 milliGRAM(s) Oral every 6 hours PRN Temp greater or equal to 38C (100.4F), Mild Pain (1 - 3)   **INCOMPLETE NOTE    OVERNIGHT EVENTS: ZULEYKA    SUBJECTIVE:  Patient seen and examined at bedside.     Vital Signs Last 12 Hrs  T(F): 97.8 (05-28-24 @ 05:54), Max: 98.4 (05-28-24 @ 02:58)  HR: 72 (05-28-24 @ 05:54) (60 - 72)  BP: 115/79 (05-28-24 @ 05:54) (115/79 - 137/84)  BP(mean): --  RR: 18 (05-28-24 @ 05:54) (16 - 18)  SpO2: 98% (05-28-24 @ 05:54) (97% - 99%)  I&O's Summary      PHYSICAL EXAM:  Constitutional: NAD, comfortable in bed.  HEENT: NC/AT, PERRLA, EOMI, no conjunctival pallor or scleral icterus, MMM  Neck: Supple, no JVD  Respiratory: CTA B/L. No w/r/r.   Cardiovascular: RRR, normal S1 and S2, no m/r/g.   Gastrointestinal: +BS, soft NTND, no guarding or rebound tenderness, no palpable masses   Extremities: wwp; no cyanosis, clubbing or edema.   Vascular: Pulses equal and strong throughout.   Neurological: AAOx3, no CN deficits, strength and sensation intact throughout.   Skin: No gross skin abnormalities or rashes        LABS:                        9.9    6.70  )-----------( 348      ( 28 May 2024 05:30 )             29.8     05-28    140  |  106  |  7   ----------------------------<  98  3.8   |  25  |  0.73    Ca    9.2      28 May 2024 05:30  Phos  4.1     05-28  Mg     2.0     05-28    TPro  6.3  /  Alb  3.7  /  TBili  0.8  /  DBili  x   /  AST  529<H>  /  ALT  441<H>  /  AlkPhos  94  05-28      Urinalysis Basic - ( 28 May 2024 05:30 )    Color: x / Appearance: x / SG: x / pH: x  Gluc: 98 mg/dL / Ketone: x  / Bili: x / Urobili: x   Blood: x / Protein: x / Nitrite: x   Leuk Esterase: x / RBC: x / WBC x   Sq Epi: x / Non Sq Epi: x / Bacteria: x          RADIOLOGY & ADDITIONAL TESTS:    MEDICATIONS  (STANDING):  escitalopram 10 milliGRAM(s) Oral daily  lactated ringers. 1000 milliLiter(s) (100 mL/Hr) IV Continuous <Continuous>  pantoprazole  Injectable 40 milliGRAM(s) IV Push daily    MEDICATIONS  (PRN):  acetaminophen     Tablet .. 650 milliGRAM(s) Oral every 6 hours PRN Temp greater or equal to 38C (100.4F), Mild Pain (1 - 3)   **INCOMPLETE NOTE    OVERNIGHT EVENTS: ZULEYKA    SUBJECTIVE:  Patient encountered sitting up in bed in no acute distress. AAO x4      Vital Signs Last 12 Hrs  T(F): 97.8 (05-28-24 @ 05:54), Max: 98.4 (05-28-24 @ 02:58)  HR: 72 (05-28-24 @ 05:54) (60 - 72)  BP: 115/79 (05-28-24 @ 05:54) (115/79 - 137/84)  BP(mean): --  RR: 18 (05-28-24 @ 05:54) (16 - 18)  SpO2: 98% (05-28-24 @ 05:54) (97% - 99%)  I&O's Summary      PHYSICAL EXAM:  Constitutional: NAD, comfortable in bed.  HEENT: NC/AT, PERRLA, EOMI, no conjunctival pallor or scleral icterus, MMM  Neck: Supple, no JVD  Respiratory: CTA B/L. No w/r/r.   Cardiovascular: RRR, normal S1 and S2, no m/r/g.   Gastrointestinal: +BS, soft NTND, no guarding or rebound tenderness, no palpable masses   Extremities: wwp; no cyanosis, clubbing or edema.   Vascular: Pulses equal and strong throughout.   Neurological: AAOx3, no CN deficits, strength and sensation intact throughout.   Skin: No gross skin abnormalities or rashes        LABS:                        9.9    6.70  )-----------( 348      ( 28 May 2024 05:30 )             29.8     05-28    140  |  106  |  7   ----------------------------<  98  3.8   |  25  |  0.73    Ca    9.2      28 May 2024 05:30  Phos  4.1     05-28  Mg     2.0     05-28    TPro  6.3  /  Alb  3.7  /  TBili  0.8  /  DBili  x   /  AST  529<H>  /  ALT  441<H>  /  AlkPhos  94  05-28      Urinalysis Basic - ( 28 May 2024 05:30 )    Color: x / Appearance: x / SG: x / pH: x  Gluc: 98 mg/dL / Ketone: x  / Bili: x / Urobili: x   Blood: x / Protein: x / Nitrite: x   Leuk Esterase: x / RBC: x / WBC x   Sq Epi: x / Non Sq Epi: x / Bacteria: x          RADIOLOGY & ADDITIONAL TESTS:    MEDICATIONS  (STANDING):  escitalopram 10 milliGRAM(s) Oral daily  lactated ringers. 1000 milliLiter(s) (100 mL/Hr) IV Continuous <Continuous>  pantoprazole  Injectable 40 milliGRAM(s) IV Push daily    MEDICATIONS  (PRN):  acetaminophen     Tablet .. 650 milliGRAM(s) Oral every 6 hours PRN Temp greater or equal to 38C (100.4F), Mild Pain (1 - 3)   **INCOMPLETE NOTE    OVERNIGHT EVENTS: ZULEYKA    SUBJECTIVE:  Patient encountered sitting up in bed in no acute distress and AAO x4. She spoke quietly and noted that her abdominal pain had greatly improved since receiving fluids in the ED last night. She denied any current nausea, vomiting, constipation or diarrhea but noted she did have some ttp on the epigastric area.    Vital Signs Last 12 Hrs  T(F): 97.8 (05-28-24 @ 05:54), Max: 98.4 (05-28-24 @ 02:58)  HR: 72 (05-28-24 @ 05:54) (60 - 72)  BP: 115/79 (05-28-24 @ 05:54) (115/79 - 137/84)  BP(mean): --  RR: 18 (05-28-24 @ 05:54) (16 - 18)  SpO2: 98% (05-28-24 @ 05:54) (97% - 99%)  I&O's Summary      PHYSICAL EXAM:  Constitutional: NAD, comfortable in bed.  HEENT: NC/AT, PERRLA, EOMI, no conjunctival pallor or scleral icterus, MMM  Neck: Supple, no JVD  Respiratory: CTA B/L. No w/r/r.   Cardiovascular: RRR, normal S1 and S2, no m/r/g.   Gastrointestinal: +BS, soft NTND, no guarding or rebound tenderness, no palpable masses   Extremities: wwp; no cyanosis, clubbing or edema.   Vascular: Pulses equal and strong throughout.   Neurological: AAOx3, no CN deficits, strength and sensation intact throughout.   Skin: No gross skin abnormalities or rashes        LABS:                        9.9    6.70  )-----------( 348      ( 28 May 2024 05:30 )             29.8     05-28    140  |  106  |  7   ----------------------------<  98  3.8   |  25  |  0.73    Ca    9.2      28 May 2024 05:30  Phos  4.1     05-28  Mg     2.0     05-28    TPro  6.3  /  Alb  3.7  /  TBili  0.8  /  DBili  x   /  AST  529<H>  /  ALT  441<H>  /  AlkPhos  94  05-28      Urinalysis Basic - ( 28 May 2024 05:30 )    Color: x / Appearance: x / SG: x / pH: x  Gluc: 98 mg/dL / Ketone: x  / Bili: x / Urobili: x   Blood: x / Protein: x / Nitrite: x   Leuk Esterase: x / RBC: x / WBC x   Sq Epi: x / Non Sq Epi: x / Bacteria: x          RADIOLOGY & ADDITIONAL TESTS:    MEDICATIONS  (STANDING):  escitalopram 10 milliGRAM(s) Oral daily  lactated ringers. 1000 milliLiter(s) (100 mL/Hr) IV Continuous <Continuous>  pantoprazole  Injectable 40 milliGRAM(s) IV Push daily    MEDICATIONS  (PRN):  acetaminophen     Tablet .. 650 milliGRAM(s) Oral every 6 hours PRN Temp greater or equal to 38C (100.4F), Mild Pain (1 - 3)   **INCOMPLETE NOTE    OVERNIGHT EVENTS: ZULEYKA    SUBJECTIVE:  Patient encountered sitting up in bed in no acute distress and AAO x4. She spoke quietly and noted that her abdominal pain had greatly improved since receiving fluids in the ED last night. She denied any current nausea, vomiting, constipation, diarrhea, hematemesis, melena, hematochezia, dyspnea or syncope but noted she did have some ttp on the epigastric area. She has felt bloated for the last few weeks and felt a pinching feeling in her chest when she received her IV fluids, but has not experienced this again since.      Vital Signs Last 12 Hrs  T(F): 97.8 (05-28-24 @ 05:54), Max: 98.4 (05-28-24 @ 02:58)  HR: 72 (05-28-24 @ 05:54) (60 - 72)  BP: 115/79 (05-28-24 @ 05:54) (115/79 - 137/84)  BP(mean): --  RR: 18 (05-28-24 @ 05:54) (16 - 18)  SpO2: 98% (05-28-24 @ 05:54) (97% - 99%)  I&O's Summary      PHYSICAL EXAM:  Constitutional: NAD, comfortable in bed.  HEENT: NC/AT, PERRLA, EOMI, no conjunctival pallor or scleral icterus, MMM  Neck: Supple, no JVD  Respiratory: CTA B/L. No w/r/r.   Cardiovascular: RRR, normal S1 and S2, no m/r/g.   Gastrointestinal: Mild ttp in epigastric region, +BS, soft NTND, no guarding or rebound tenderness, no palpable masses   Extremities: wwp; no cyanosis, clubbing or edema.   Vascular: Pulses equal and strong throughout.   Neurological: AAOx3, no CN deficits, strength and sensation intact throughout.   Skin: No gross skin abnormalities or rashes        LABS:                        9.9    6.70  )-----------( 348      ( 28 May 2024 05:30 )             29.8     05-28    140  |  106  |  7   ----------------------------<  98  3.8   |  25  |  0.73    Ca    9.2      28 May 2024 05:30  Phos  4.1     05-28  Mg     2.0     05-28    TPro  6.3  /  Alb  3.7  /  TBili  0.8  /  DBili  x   /  AST  529<H>  /  ALT  441<H>  /  AlkPhos  94  05-28      Urinalysis Basic - ( 28 May 2024 05:30 )    Color: x / Appearance: x / SG: x / pH: x  Gluc: 98 mg/dL / Ketone: x  / Bili: x / Urobili: x   Blood: x / Protein: x / Nitrite: x   Leuk Esterase: x / RBC: x / WBC x   Sq Epi: x / Non Sq Epi: x / Bacteria: x          RADIOLOGY & ADDITIONAL TESTS:    MEDICATIONS  (STANDING):  escitalopram 10 milliGRAM(s) Oral daily  lactated ringers. 1000 milliLiter(s) (100 mL/Hr) IV Continuous <Continuous>  pantoprazole  Injectable 40 milliGRAM(s) IV Push daily    MEDICATIONS  (PRN):  acetaminophen     Tablet .. 650 milliGRAM(s) Oral every 6 hours PRN Temp greater or equal to 38C (100.4F), Mild Pain (1 - 3)   OVERNIGHT EVENTS: ZULEYKA    SUBJECTIVE:  Patient encountered sitting up in bed in no acute distress and AAO x4. She spoke quietly and noted that her abdominal pain had greatly improved since receiving fluids in the ED last night. She denied any current nausea, vomiting, constipation, diarrhea, hematemesis, melena, hematochezia, dyspnea or syncope but noted she did have some ttp on the epigastric area. She has felt bloated for the last few weeks and felt a pinching feeling in her chest when she received her IV fluids, but has not experienced this again since.      Vital Signs Last 12 Hrs  T(F): 97.8 (05-28-24 @ 05:54), Max: 98.4 (05-28-24 @ 02:58)  HR: 72 (05-28-24 @ 05:54) (60 - 72)  BP: 115/79 (05-28-24 @ 05:54) (115/79 - 137/84)  BP(mean): --  RR: 18 (05-28-24 @ 05:54) (16 - 18)  SpO2: 98% (05-28-24 @ 05:54) (97% - 99%)  I&O's Summary      PHYSICAL EXAM:  Constitutional: NAD, comfortable in bed.  HEENT: NC/AT, PERRLA, EOMI, no conjunctival pallor or scleral icterus, MMM  Neck: Supple, no JVD  Respiratory: CTA B/L. No w/r/r.   Cardiovascular: RRR, normal S1 and S2, no m/r/g.   Gastrointestinal: Mild ttp in epigastric region, +BS, soft NTND, no guarding or rebound tenderness, no palpable masses   Extremities: wwp; no cyanosis, clubbing or edema.   Vascular: Pulses equal and strong throughout.   Neurological: AAOx3, no CN deficits, strength and sensation intact throughout.   Skin: No gross skin abnormalities or rashes        LABS:                        9.9    6.70  )-----------( 348      ( 28 May 2024 05:30 )             29.8     05-28    140  |  106  |  7   ----------------------------<  98  3.8   |  25  |  0.73    Ca    9.2      28 May 2024 05:30  Phos  4.1     05-28  Mg     2.0     05-28    TPro  6.3  /  Alb  3.7  /  TBili  0.8  /  DBili  x   /  AST  529<H>  /  ALT  441<H>  /  AlkPhos  94  05-28      Urinalysis Basic - ( 28 May 2024 05:30 )    Color: x / Appearance: x / SG: x / pH: x  Gluc: 98 mg/dL / Ketone: x  / Bili: x / Urobili: x   Blood: x / Protein: x / Nitrite: x   Leuk Esterase: x / RBC: x / WBC x   Sq Epi: x / Non Sq Epi: x / Bacteria: x          RADIOLOGY & ADDITIONAL TESTS:    MEDICATIONS  (STANDING):  escitalopram 10 milliGRAM(s) Oral daily  lactated ringers. 1000 milliLiter(s) (100 mL/Hr) IV Continuous <Continuous>  pantoprazole  Injectable 40 milliGRAM(s) IV Push daily    MEDICATIONS  (PRN):  acetaminophen     Tablet .. 650 milliGRAM(s) Oral every 6 hours PRN Temp greater or equal to 38C (100.4F), Mild Pain (1 - 3)

## 2024-05-28 NOTE — H&P ADULT - NSHPLABSRESULTS_GEN_ALL_CORE
11.7   7.42  )-----------( 422      ( 27 May 2024 22:51 )             35.0       05-27    141  |  102  |  9   ----------------------------<  109<H>  3.8   |  27  |  0.75    Ca    10.1      27 May 2024 22:51  Mg     2.0     05-27    TPro  7.5  /  Alb  4.2  /  TBili  0.6  /  DBili  x   /  AST  197<H>  /  ALT  172<H>  /  AlkPhos  88  05-27              Urinalysis Basic - ( 27 May 2024 22:51 )    Color: x / Appearance: x / SG: x / pH: x  Gluc: 109 mg/dL / Ketone: x  / Bili: x / Urobili: x   Blood: x / Protein: x / Nitrite: x   Leuk Esterase: x / RBC: x / WBC x   Sq Epi: x / Non Sq Epi: x / Bacteria: x            Lactate Trend            CAPILLARY BLOOD GLUCOSE

## 2024-05-28 NOTE — PROGRESS NOTE ADULT - PROBLEM SELECTOR PLAN 1
Patient with epigastric pain that radiates diffusely across the abdomen and into the back w/ acid reflux, chest tightness, vomiting. Started on Saturday, went away on Sunday, and reappeared on Monday afternoon. Pain started on both occasions after eating and gradually worsened as time passed. Patient took Pepto Bismol without relief. Denies regular alcohol use, tobacco, drug use. Cholesterol levels are at upper limit of normal per patient. No hx of gallbladder issues or family history of such. Recent travel hx to [place] where she got food poisoning.    Lipase only at 105 and US with no visualization of pancreatic inflammation. CT abdomen was not yet performed. AST/ALT was elevated and US did visualize mildly dilated CBD. Symptoms appear to fit more clinical picture for PUD especially given hx of GERD and can also see elevated AST/ALT in these cases. Only 1/3 criteria met to elucidate pancreatitis diagnosis given abdominal examination. CBD is mildly dilated which can indicate possible stone/passed stone that then induced minor pancreatitis.     DDX: PUD vs Pancreatitis    Plan:   - C/w Maintenance fluids at 100 cc/hr  - F/u CT AP  - F/u Triglyceride level  - Pantoprazole 40 mg IV qd  - Pain Control  - Clear liquid diet, increase as tolerated  - Possible GI consult if symptoms worsen and/or labs worsen  - If concern for PUD, will need scope for accurate identification Patient with epigastric pain that radiates diffusely across the abdomen and into the back w/ acid reflux, chest tightness, vomiting. Started on Saturday evening, went away for all of Sunday, and reappeared on Monday afternoon. Pain started on both occasions after eating and gradually worsened as time passed. Patient took Pepto Bismol and Ibuprofen without relief. Denies regular alcohol use, tobacco, drug use. Cholesterol levels are at upper limit of normal per patient. No hx of gallbladder issues or family history of such. Recent travel hx to [place] where she got food poisoning that caused diarrhea; she took 3 days of Azithromycin at this time.    Lipase only at 105 and US with no visualization of pancreatic inflammation. Noncontrast CT abdomen non-remarkable for pancreatitis, liver disease, gallbladder disease. AST/ALT was elevated and US did visualize mildly dilated CBD. Symptoms appear to fit more clinical picture for PUD especially given hx of GERD and can also see elevated AST/ALT in these cases. Only 1/3 criteria met to elucidate pancreatitis diagnosis given abdominal examination due to low lipase elevation and negative CT. CBD is mildly dilated which can indicate possible stone/passed stone that then induced minor pancreatitis.     Patient noted pain was down to 2/10 from 9/10 and tolerated liquid foods.    DDX: Likely PUD     Plan:   - C/w Maintenance fluids at 100 cc/hr  - Triglycerides and total cholesterol WNL, LDL mildly elevated at 119  -Continue Pantoprazole 40 mg IV qd  - Pain Control - continue with Tylenol, d/c morphine  - Liquid diet tolerated, advancing to solid foods  - F/u with outpatient GI consult if LFTs downtrend  - If LFTs remain elevated, will run hepatitis panel and HUSSEIN, anti smooth-muscle and anti-mitochondrial antibody panels Patient with epigastric pain that radiates diffusely across the abdomen and into the back w/ acid reflux, chest tightness, vomiting. Started on Saturday evening, went away for all of Sunday, and reappeared on Monday afternoon. Pain started on both occasions after eating and gradually worsened as time passed. Patient took Pepto Bismol and Ibuprofen without relief. Denies regular alcohol use, tobacco, drug use. Cholesterol levels are at upper limit of normal per patient. No hx of gallbladder issues or family history of such. Recent travel hx to [place] where she got food poisoning that caused diarrhea; she took 3 days of Azithromycin at this time.    Lipase only at 105 and US with no visualization of pancreatic inflammation. Noncontrast CT abdomen non-remarkable for pancreatitis, liver disease, gallbladder disease. AST/ALT was elevated and US did visualize mildly dilated CBD. Symptoms appear to fit more clinical picture for PUD especially given hx of GERD and can also see elevated AST/ALT in these cases. Only 1/3 criteria met to elucidate pancreatitis diagnosis given abdominal examination due to low lipase elevation and negative CT. CBD is mildly dilated which can indicate possible stone/passed stone that then induced minor pancreatitis.     Patient noted pain was down to 2/10 from 9/10 and tolerated liquid foods.    DDX: Likely PUD vs. pancreatitis    Plan:   - C/w Maintenance fluids at 100 cc/hr  - Triglycerides and total cholesterol WNL, LDL mildly elevated at 119  -Continue Pantoprazole 40 mg IV qd  - Pain Control - continue with Tylenol, d/c morphine  - Liquid diet tolerated, advancing to solid foods  - F/u with outpatient GI consult if LFTs downtrend

## 2024-05-28 NOTE — PROGRESS NOTE ADULT - PROBLEM SELECTOR PLAN 3
Patient takes Lexapro 10 mg qd.    Plan:   - C/w home medication Patient takes Lexapro 10 mg qd.    Plan:   -C/w home medication

## 2024-05-28 NOTE — H&P ADULT - ATTENDING COMMENTS
Pt is 37 yo woman who was admitted with epigastric tenderness. Pt improved on PPI/H2 blockers therapy. Labs significant for raising AST/ALT. US and CT did not show significant abnormalities only biliary sludge and mildly dilated CBD.   ----tracing LFTs, follow on hepatitis work up and HUSSEIN/anti-smooth muscle antibodies.   ----diet is being advanced. Cont PPI therapy. Pt will need testing for H. [place]

## 2024-05-28 NOTE — PROGRESS NOTE ADULT - PROBLEM SELECTOR PLAN 4
Patient with history of ADHD, takes concerta 27 mg when at work.    Plan:   - Hold medication while in hospital Patient with history of ADHD, takes concerta 27 mg when at work.    Plan:  -Hold medication while in hospital

## 2024-05-28 NOTE — H&P ADULT - PROBLEM SELECTOR PLAN 5
F:  cc/hr  E: Replete as needed  N: Clear Liquid Diet ->  Advance as tolerated  DVT Proph: SCD's  GI Proph: Pantoprazole  Code Status: Full Code  Dispo: [facility]

## 2024-05-28 NOTE — H&P ADULT - HISTORY OF PRESENT ILLNESS
Ms. Vazquez is a 39 yo female with pmhx of GERD presenting with abdominal pain for last two days.      Ms. Vazquez is a 37 yo female with pmhx of GERD presenting with abdominal pain. Shes states that the pain began on Saturday night after she ate dinner and drank a glass of wine. She describes the pain as starting predominantly in the epigastric region and radiating diffusely to the rest of the abdomen and back with acid reflux, consequential vomiting, and chest tightness. The only position that allowed for relief was when she curled up into a ball. Sunday the pain went away but the pain then came back yesterday around 4 after she ate food. The pain continued to persist which is what brought her in. She denies having this pain prior. She denies regular alcohol use, tobacco, drug use. Admits to having cholesterol levels that are within upper limit of normal per patient. Denies having any prior history of RUQ pain nor family history of gallbladder associated pathology. Admits to having recent travels to [facility] (where she is from) over a month ago where at that time got bad food poisoning along with other family members. Abdominal pain is well controlled after intervention from the ED but still has mild complaints otherwise has no other complaints.    In the ED:  Initial vital signs: T: XX F, HR: XX, BP: XX, R: XX, SpO2: XX% on RA  Labs: significant for  Imaging:  CXR:   EKG:   Medications:   Consults: none      Ms. Vazquez is a 39 yo female with pmhx of GERD presenting with abdominal pain. Shes states that the pain began on Saturday night after she ate dinner and drank a glass of wine. She describes the pain as starting predominantly in the epigastric region and radiating diffusely to the rest of the abdomen and back with acid reflux, consequential vomiting, and chest tightness. The only position that allowed for relief was when she curled up into a ball. Sunday the pain went away but the pain then came back yesterday around 4 after she ate food. The pain continued to persist which is what brought her in. She denies having this pain prior. She denies regular alcohol use, tobacco, drug use. Admits to having cholesterol levels that are within upper limit of normal per patient. Denies having any prior history of RUQ pain nor family history of gallbladder associated pathology. Admits to having recent travels to [facility] (where she is from) over a month ago where at that time got bad food poisoning along with other family members. Abdominal pain is well controlled after intervention from the ED but still has mild complaints otherwise has no other complaints.    In the ED:  Initial vital signs: T: 98 F, HR: 77, BP: 130/90, R: 16, SpO2: 99% on RA  Labs: significant for platelets of 422, AST/ALT of 197/172, Lipase of 105  Imaging: RUQ US evidencing mildly dilated CBD  CXR: None  EKG: NSR  Medications: Maalox, famotidine 20 mg, protoxni 20 mg IV, toradol 15 mg x1, NS 1L  Consults: none      Ms. Vazquez is a 37 yo female with pmhx of GERD presenting with abdominal pain. Shes states that the pain began on Saturday night after she ate dinner and drank a glass of wine. She describes the pain as starting predominantly in the epigastric region and radiating diffusely to the rest of the abdomen and back with acid reflux, consequential vomiting, and chest tightness. The only position that allowed for relief was when she curled up into a ball. Sunday the pain went away but the pain then came back yesterday around 4 after she ate food. The pain continued to persist which is what brought her in. She denies having this pain prior. She denies regular alcohol use, tobacco, drug use. Admits to having cholesterol levels that are within upper limit of normal per patient. Denies having any prior history of RUQ pain nor family history of gallbladder associated pathology. Admits to having recent travels to [facility] (where she is from) over a month ago where at that time got bad food poisoning along with other family members. Abdominal pain is well controlled after intervention from the ED but still has mild complaints otherwise has no other complaints.    In the ED:  Initial vital signs: T: 98 F, HR: 77, BP: 130/90, R: 16, SpO2: 99% on RA  Labs: significant for platelets of 422, AST/ALT of 197/172, Lipase of 105  Imaging: RUQ US evidencing mildly dilated CBD  CXR: None  EKG: NSR  Medications: Maalox, famotidine 20 mg, protonix 20 mg IV, toradol 15 mg x1, NS 1L  Consults: none      Ms. Vazquez is a 39 yo female with pmhx of GERD presenting with abdominal pain. Shes states that the pain began on Saturday night after she ate dinner and drank a glass of wine. She describes the pain as starting predominantly in the epigastric region and radiating diffusely to the rest of the abdomen and back with acid reflux, consequential vomiting, and chest tightness. The only position that allowed for relief was when she curled up. Sunday the pain went away but the pain then came back yesterday around 4 after she ate food. The pain continued to persist which is what brought her in. She denies having this pain prior. She denies regular alcohol use, tobacco, drug use. Admits to having cholesterol levels that are within upper limit of normal per patient. Denies having any prior history of RUQ pain nor family history of gallbladder associated pathology. Admits to having recent travels to [facility] (where she is from) over a month ago where at that time got bad food poisoning along with other family members. Abdominal pain is well controlled after intervention from the ED but still has mild complaints otherwise has no other complaints.    In the ED:  Initial vital signs: T: 98 F, HR: 77, BP: 130/90, R: 16, SpO2: 99% on RA  Labs: significant for platelets of 422, AST/ALT of 197/172, Lipase of 105  Imaging: RUQ US evidencing mildly dilated CBD  CXR: None  EKG: NSR  Medications: Maalox, famotidine 20 mg, protonix 20 mg IV, toradol 15 mg x1, NS 1L  Consults: none      Ms. Vazquez is a 37 yo female with pmhx of GERD presenting with abdominal pain. Shes states that the pain began on Saturday night after she ate dinner and drank a glass of wine. She describes the pain as starting predominantly in the epigastric region and radiating diffusely to the rest of the abdomen and back with acid reflux, consequential vomiting, and chest tightness. The only position that allowed for relief was when she was curled up. Sunday the pain went away but the pain then came back yesterday around 4 after eating. The pain continued to persist which is what brought her in. She denies having this pain prior. She denies regular alcohol use, tobacco use, drug use. Admits to having cholesterol levels that are at the upper limit of normal. Denies having any prior history of RUQ pain nor family history of gallbladder associated pathology. Admits to travelling recently to [place], her home country, over a month ago. At that time she got bad food poisoning along with other family members. Abdominal pain is well controlled after intervention from the ED but still has mild complaints.    In the ED:  Initial vital signs: T: 98 F, HR: 77, BP: 130/90, R: 16, SpO2: 99% on RA  Labs: significant for platelets of 422, AST/ALT of 197/172, Lipase of 105  Imaging: RUQ US evidencing mildly dilated CBD  CXR: None  EKG: NSR  Medications: Maalox, famotidine 20 mg, protonix 20 mg IV, toradol 15 mg x1, NS 1L  Consults: none

## 2024-05-28 NOTE — H&P ADULT - PROBLEM SELECTOR PLAN 4
Patient with history of ADHD, takes concerta 27 mg when at work.    Plan:   - Hold medication while in hospital

## 2024-05-28 NOTE — H&P ADULT - ASSESSMENT
Ms. Vazquez is a 39 yo female with pmhx of GERD presenting with abdominal pain for last two days.  Ms. Vazquez is a 37 yo female with pmhx of GERD presenting with abdominal pain for last two days, thought to be peptic ulcer disease vs pancreatitis.

## 2024-05-28 NOTE — H&P ADULT - NSHPREVIEWOFSYSTEMS_GEN_ALL_CORE
REVIEW OF SYSTEMS:  CONSTITUTIONAL: No weakness, fevers, or chills  EYES/ENT: No visual changes; No vertigo, throat pain, or dysphagia  NECK: No pain or stiffness  RESPIRATORY: No cough, wheezing, hemoptysis, or shortness of breath  CARDIOVASCULAR: No chest pain or palpitations  GASTROINTESTINAL: Mild epigastric pain. No nausea, vomiting, or hematemesis; No diarrhea or constipation. No melena or hematochezia.  GENITOURINARY: No dysuria, frequency, or hematuria  MUSCULOSKELETAL: No joint or muscle pain or aches  NEUROLOGICAL: No numbness or weakness  SKIN: No itching or rashes

## 2024-05-28 NOTE — PROGRESS NOTE ADULT - PROBLEM SELECTOR PLAN 5
F:  cc/hr  E: Replete as needed  N: Clear Liquid Diet ->  Advance as tolerated  DVT Proph: SCD's  GI Proph: Pantoprazole  Code Status: Full Code  Dispo: [facility] F:  cc/hr  E: Replete as needed  N: Advanced to solid diet  DVT Proph: SCD's  GI Proph: Pantoprazole  Code Status: Full Code  Dispo: F

## 2024-05-29 ENCOUNTER — TRANSCRIPTION ENCOUNTER (OUTPATIENT)
Age: 39
End: 2024-05-29

## 2024-05-29 VITALS
HEART RATE: 75 BPM | TEMPERATURE: 98 F | SYSTOLIC BLOOD PRESSURE: 121 MMHG | DIASTOLIC BLOOD PRESSURE: 72 MMHG | OXYGEN SATURATION: 98 % | RESPIRATION RATE: 17 BRPM

## 2024-05-29 LAB
ALBUMIN SERPL ELPH-MCNC: 3.7 G/DL — SIGNIFICANT CHANGE UP (ref 3.3–5)
ALP SERPL-CCNC: 109 U/L — SIGNIFICANT CHANGE UP (ref 40–120)
ALT FLD-CCNC: 413 U/L — HIGH (ref 10–45)
ANION GAP SERPL CALC-SCNC: 7 MMOL/L — SIGNIFICANT CHANGE UP (ref 5–17)
APTT BLD: 35 SEC — SIGNIFICANT CHANGE UP (ref 24.5–35.6)
AST SERPL-CCNC: 221 U/L — HIGH (ref 10–40)
BASOPHILS # BLD AUTO: 0.04 K/UL — SIGNIFICANT CHANGE UP (ref 0–0.2)
BASOPHILS NFR BLD AUTO: 0.8 % — SIGNIFICANT CHANGE UP (ref 0–2)
BILIRUB SERPL-MCNC: 0.3 MG/DL — SIGNIFICANT CHANGE UP (ref 0.2–1.2)
BUN SERPL-MCNC: 5 MG/DL — LOW (ref 7–23)
CALCIUM SERPL-MCNC: 9.1 MG/DL — SIGNIFICANT CHANGE UP (ref 8.4–10.5)
CHLORIDE SERPL-SCNC: 106 MMOL/L — SIGNIFICANT CHANGE UP (ref 96–108)
CO2 SERPL-SCNC: 26 MMOL/L — SIGNIFICANT CHANGE UP (ref 22–31)
CREAT SERPL-MCNC: 0.77 MG/DL — SIGNIFICANT CHANGE UP (ref 0.5–1.3)
EGFR: 101 ML/MIN/1.73M2 — SIGNIFICANT CHANGE UP
EOSINOPHIL # BLD AUTO: 0.18 K/UL — SIGNIFICANT CHANGE UP (ref 0–0.5)
EOSINOPHIL NFR BLD AUTO: 3.5 % — SIGNIFICANT CHANGE UP (ref 0–6)
GLUCOSE SERPL-MCNC: 109 MG/DL — HIGH (ref 70–99)
HCT VFR BLD CALC: 32.2 % — LOW (ref 34.5–45)
HGB BLD-MCNC: 10.4 G/DL — LOW (ref 11.5–15.5)
IMM GRANULOCYTES NFR BLD AUTO: 0.6 % — SIGNIFICANT CHANGE UP (ref 0–0.9)
INR BLD: 0.91 — SIGNIFICANT CHANGE UP (ref 0.85–1.18)
LYMPHOCYTES # BLD AUTO: 1.16 K/UL — SIGNIFICANT CHANGE UP (ref 1–3.3)
LYMPHOCYTES # BLD AUTO: 22.4 % — SIGNIFICANT CHANGE UP (ref 13–44)
MAGNESIUM SERPL-MCNC: 2 MG/DL — SIGNIFICANT CHANGE UP (ref 1.6–2.6)
MCHC RBC-ENTMCNC: 28.3 PG — SIGNIFICANT CHANGE UP (ref 27–34)
MCHC RBC-ENTMCNC: 32.3 GM/DL — SIGNIFICANT CHANGE UP (ref 32–36)
MCV RBC AUTO: 87.7 FL — SIGNIFICANT CHANGE UP (ref 80–100)
MONOCYTES # BLD AUTO: 0.27 K/UL — SIGNIFICANT CHANGE UP (ref 0–0.9)
MONOCYTES NFR BLD AUTO: 5.2 % — SIGNIFICANT CHANGE UP (ref 2–14)
NEUTROPHILS # BLD AUTO: 3.51 K/UL — SIGNIFICANT CHANGE UP (ref 1.8–7.4)
NEUTROPHILS NFR BLD AUTO: 67.5 % — SIGNIFICANT CHANGE UP (ref 43–77)
NRBC # BLD: 0 /100 WBCS — SIGNIFICANT CHANGE UP (ref 0–0)
PHOSPHATE SERPL-MCNC: 3.4 MG/DL — SIGNIFICANT CHANGE UP (ref 2.5–4.5)
PLATELET # BLD AUTO: 359 K/UL — SIGNIFICANT CHANGE UP (ref 150–400)
POTASSIUM SERPL-MCNC: 4.1 MMOL/L — SIGNIFICANT CHANGE UP (ref 3.5–5.3)
POTASSIUM SERPL-SCNC: 4.1 MMOL/L — SIGNIFICANT CHANGE UP (ref 3.5–5.3)
PROT SERPL-MCNC: 6.8 G/DL — SIGNIFICANT CHANGE UP (ref 6–8.3)
PROTHROM AB SERPL-ACNC: 10.4 SEC — SIGNIFICANT CHANGE UP (ref 9.5–13)
RBC # BLD: 3.67 M/UL — LOW (ref 3.8–5.2)
RBC # FLD: 12.4 % — SIGNIFICANT CHANGE UP (ref 10.3–14.5)
SODIUM SERPL-SCNC: 139 MMOL/L — SIGNIFICANT CHANGE UP (ref 135–145)
WBC # BLD: 5.19 K/UL — SIGNIFICANT CHANGE UP (ref 3.8–10.5)
WBC # FLD AUTO: 5.19 K/UL — SIGNIFICANT CHANGE UP (ref 3.8–10.5)

## 2024-05-29 PROCEDURE — 86704 HEP B CORE ANTIBODY TOTAL: CPT

## 2024-05-29 PROCEDURE — 80061 LIPID PANEL: CPT

## 2024-05-29 PROCEDURE — 85730 THROMBOPLASTIN TIME PARTIAL: CPT

## 2024-05-29 PROCEDURE — 84702 CHORIONIC GONADOTROPIN TEST: CPT

## 2024-05-29 PROCEDURE — 83690 ASSAY OF LIPASE: CPT

## 2024-05-29 PROCEDURE — 76705 ECHO EXAM OF ABDOMEN: CPT

## 2024-05-29 PROCEDURE — 82977 ASSAY OF GGT: CPT

## 2024-05-29 PROCEDURE — 86709 HEPATITIS A IGM ANTIBODY: CPT

## 2024-05-29 PROCEDURE — 85025 COMPLETE CBC W/AUTO DIFF WBC: CPT

## 2024-05-29 PROCEDURE — 84100 ASSAY OF PHOSPHORUS: CPT

## 2024-05-29 PROCEDURE — 86705 HEP B CORE ANTIBODY IGM: CPT

## 2024-05-29 PROCEDURE — 83735 ASSAY OF MAGNESIUM: CPT

## 2024-05-29 PROCEDURE — 84484 ASSAY OF TROPONIN QUANT: CPT

## 2024-05-29 PROCEDURE — 80053 COMPREHEN METABOLIC PANEL: CPT

## 2024-05-29 PROCEDURE — 36415 COLL VENOUS BLD VENIPUNCTURE: CPT

## 2024-05-29 PROCEDURE — 87340 HEPATITIS B SURFACE AG IA: CPT

## 2024-05-29 PROCEDURE — 93005 ELECTROCARDIOGRAM TRACING: CPT

## 2024-05-29 PROCEDURE — 86803 HEPATITIS C AB TEST: CPT

## 2024-05-29 PROCEDURE — 84478 ASSAY OF TRIGLYCERIDES: CPT

## 2024-05-29 PROCEDURE — 99239 HOSP IP/OBS DSCHRG MGMT >30: CPT | Mod: GC

## 2024-05-29 PROCEDURE — 86706 HEP B SURFACE ANTIBODY: CPT

## 2024-05-29 PROCEDURE — 96374 THER/PROPH/DIAG INJ IV PUSH: CPT

## 2024-05-29 PROCEDURE — 96375 TX/PRO/DX INJ NEW DRUG ADDON: CPT

## 2024-05-29 PROCEDURE — 85610 PROTHROMBIN TIME: CPT

## 2024-05-29 PROCEDURE — 99285 EMERGENCY DEPT VISIT HI MDM: CPT | Mod: 25

## 2024-05-29 PROCEDURE — 74176 CT ABD & PELVIS W/O CONTRAST: CPT | Mod: MC

## 2024-05-29 RX ORDER — PANTOPRAZOLE SODIUM 20 MG/1
1 TABLET, DELAYED RELEASE ORAL
Qty: 30 | Refills: 0
Start: 2024-05-29 | End: 2024-06-27

## 2024-05-29 RX ADMIN — Medication 650 MILLIGRAM(S): at 08:32

## 2024-05-29 RX ADMIN — Medication 650 MILLIGRAM(S): at 09:32

## 2024-05-29 RX ADMIN — ESCITALOPRAM OXALATE 10 MILLIGRAM(S): 10 TABLET, FILM COATED ORAL at 12:07

## 2024-05-29 RX ADMIN — PANTOPRAZOLE SODIUM 40 MILLIGRAM(S): 20 TABLET, DELAYED RELEASE ORAL at 06:40

## 2024-05-29 NOTE — DISCHARGE NOTE PROVIDER - NSDCCPCAREPLAN_GEN_ALL_CORE_FT
PRINCIPAL DISCHARGE DIAGNOSIS  Diagnosis: Abdominal pain  Assessment and Plan of Treatment: You came to the hospital for abdominal pain. Abdominal pain can be caused by several different things but in your case, the cause of your abdominal pain is not clear. We suspect that your pain might have been due to a stone in your gallbladder that passed. You were started on a medication called Pantoprazole while you were in the hospital and it is important that you continue this medication after discharge as well. Please follow-up with your PCP within 1-2 weeks of discharge.

## 2024-05-29 NOTE — DISCHARGE NOTE PROVIDER - NSDCCPTREATMENT_GEN_ALL_CORE_FT
PRINCIPAL PROCEDURE  Procedure: CT abdomen  Findings and Treatment: 1.  Limited evaluation without intravenous contrast, however, apparent periportal edema. Indeterminate.  2.  Varying density within the gallbladder; possible gallbladder sludge. However, no gallbladder sludge or gallbladder wall thickening was noted on the recent ultrasound.  3.  Pancreas grossly unremarkable. No pancreatic ductal dilatation. Limited evaluation.        SECONDARY PROCEDURE  Procedure: Right upper quadrant ultrasound  Findings and Treatment: 1.  No cholelithiasis or evidence of acute cholecystitis.  2.  Mildly dilated common bile duct measuring up to 8 mm in caliber. No choledocholithiasis visualized. Consider nonemergent MRCP for further evaluation.

## 2024-05-29 NOTE — DISCHARGE NOTE PROVIDER - DISCHARGE SERVICE FOR PATIENT
on the discharge service for the patient. I have reviewed and made amendments to the documentation where necessary. (3) no apparent problem

## 2024-05-29 NOTE — DISCHARGE NOTE PROVIDER - ATTENDING DISCHARGE PHYSICAL EXAMINATION:
Pt is 37 yo woman who was admitted with epigastric tenderness. Pt improved on PPI/H2 blockers therapy. Labs significant for raising AST/ALT. US and CT did not show significant abnormalities only biliary sludge and mildly dilated CBD.   ----LFTs improved, pt feels much better. Can be discharged on PPI therapy for presumed PUD and follow up in GI clinic to follow for possible biliary colic

## 2024-05-29 NOTE — DISCHARGE NOTE PROVIDER - NSDCMRMEDTOKEN_GEN_ALL_CORE_FT
Concerta 27 mg/24 hr oral tablet, extended release: 1 tab(s) orally once a day  Lexapro 10 mg oral tablet: 1 tab(s) orally once a day  pantoprazole 40 mg oral delayed release tablet: 1 tab(s) orally once a day (before a meal)

## 2024-05-29 NOTE — DISCHARGE NOTE PROVIDER - HOSPITAL COURSE
#Discharge: do not delete    YARELIS ARAUJO is a 39 y/o Female with a PMHx of GERD who presented for epigastric pain x2 days. Her pain resolved s/p analgesics and PPI. She states her pain is now much improved, she is tolerating a PO diet, and has not experienced any n/v.     Problem List/Main Diagnoses (system-based):   Problem: Abdominal pain.   Patient presented with epigastric pain that radiates diffusely across the abdomen and into the back w/ acid reflux, chest tightness, vomiting. Started on Saturday evening, went away for all of Sunday, and reappeared on Monday afternoon. Pain started on both occasions after eating and gradually worsened as time passed. Patient took Pepto Bismol and Ibuprofen without relief. Denies regular alcohol use, tobacco, drug use. Cholesterol levels are at upper limit of normal per patient. No hx of gallbladder issues or family history of such. Recent travel hx to [place] where she got food poisoning that caused diarrhea; she took 3 days of Azithromycin at this time.  Lipase only at 105 and US with no visualization of pancreatic inflammation. Noncontrast CT abdomen non-remarkable for pancreatitis, liver disease, gallbladder disease. AST/ALT was elevated and US did visualize mildly dilated CBD. Symptoms appear to fit more clinical picture for PUD especially given hx of GERD and can also see elevated AST/ALT in these cases. Only 1/3 criteria met to elucidate pancreatitis diagnosis given abdominal examination due to low lipase elevation and negative CT. CBD is mildly dilated which can indicate possible stone/passed stone.   - patient states her pain has improved and is down to 2/10 from 9/10  - patient is tolerating PO diet   - will discharge on PPI- pantoprazole 40mg   - LFTs have downtrended 604 --> 221, 553 -->413  - follow-up with PCP within 1-2 weeks of discharge     Problem: GERD (gastroesophageal reflux disease).   Patient with history of GERD, was not on any medications.   - will discharge on PPI- pantoprazole 40mg     Problem: Anxiety.   Patient with hx of anxiety. Home med: Lexapro 10 mg qd.  - c/w home medication    Problem: ADHD.   Patient with history of ADHD, takes concerta 27 mg when at work.  - c/w home med     Patient was discharged to: home     New medications: pantoprazole 40mg   Changes to old medications: none  Medications that were stopped: none     Items to follow up as outpatient: f/u with PCP within 1-2 weeks of discharge     Physical exam at the time of discharge:   Constitutional: NAD, comfortable in bed.  HEENT: no conjunctival pallor or scleral icterus, MMM  Neck: Supple, no JVD  Respiratory: CTA B/L. No w/r/r.   Cardiovascular: RRR, normal S1 and S2, no m/r/g.   Gastrointestinal: Mild ttp in epigastric region, +BS, soft, no guarding or rebound tenderness, no palpable masses   Extremities: wwp; no cyanosis, clubbing or edema.   Neurological: AAOx3

## 2024-05-29 NOTE — DISCHARGE NOTE NURSING/CASE MANAGEMENT/SOCIAL WORK - NSDCPEFALRISK_GEN_ALL_CORE
For information on Fall & Injury Prevention, visit: [email or web address] OR  [email or web address] OR  https://www.cdc.gov/steadi/patient.html

## 2024-05-29 NOTE — DISCHARGE NOTE NURSING/CASE MANAGEMENT/SOCIAL WORK - PATIENT PORTAL LINK FT
You can access the FollowMyHealth Patient Portal offered by [facility] by registering at the following website: [email or web address]. By joining [facility]’s FollowMyHealth portal, you will also be able to view your health information using other applications (apps) compatible with our system.

## 2024-06-04 DIAGNOSIS — F90.9 ATTENTION-DEFICIT HYPERACTIVITY DISORDER, UNSPECIFIED TYPE: ICD-10-CM

## 2024-06-04 DIAGNOSIS — F41.9 ANXIETY DISORDER, UNSPECIFIED: ICD-10-CM

## 2024-06-04 DIAGNOSIS — K21.9 GASTRO-ESOPHAGEAL REFLUX DISEASE WITHOUT ESOPHAGITIS: ICD-10-CM

## 2024-06-04 DIAGNOSIS — K80.20 CALCULUS OF GALLBLADDER WITHOUT CHOLECYSTITIS WITHOUT OBSTRUCTION: ICD-10-CM

## 2024-06-04 DIAGNOSIS — K27.9 PEPTIC ULCER, SITE UNSPECIFIED, UNSPECIFIED AS ACUTE OR CHRONIC, WITHOUT HEMORRHAGE OR PERFORATION: ICD-10-CM

## 2024-06-04 DIAGNOSIS — R10.9 UNSPECIFIED ABDOMINAL PAIN: ICD-10-CM
